# Patient Record
Sex: FEMALE | URBAN - METROPOLITAN AREA
[De-identification: names, ages, dates, MRNs, and addresses within clinical notes are randomized per-mention and may not be internally consistent; named-entity substitution may affect disease eponyms.]

---

## 2024-02-28 ENCOUNTER — OFFICE VISIT (OUTPATIENT)
Dept: PEDIATRICS | Facility: CLINIC | Age: 34
End: 2024-02-28
Payer: MEDICAID

## 2024-02-28 VITALS
BODY MASS INDEX: 37.5 KG/M2 | HEIGHT: 63 IN | DIASTOLIC BLOOD PRESSURE: 69 MMHG | SYSTOLIC BLOOD PRESSURE: 113 MMHG | HEART RATE: 82 BPM | WEIGHT: 211.63 LBS | TEMPERATURE: 98 F

## 2024-02-28 DIAGNOSIS — Z71.84 TRAVEL ADVICE ENCOUNTER: Primary | ICD-10-CM

## 2024-02-28 PROCEDURE — 99999PBSHW HEPATITIS B VACCINE PEDIATRIC / ADOLESCENT 3-DOSE IM: Mod: PBBFAC,,,

## 2024-02-28 PROCEDURE — 90691 TYPHOID VACCINE IM: CPT | Mod: PBBFAC,PN

## 2024-02-28 PROCEDURE — 99203 OFFICE O/P NEW LOW 30 MIN: CPT | Mod: S$PBB,,, | Performed by: PEDIATRICS

## 2024-02-28 PROCEDURE — 90744 HEPB VACC 3 DOSE PED/ADOL IM: CPT | Mod: PBBFAC,PN

## 2024-02-28 PROCEDURE — 1159F MED LIST DOCD IN RCRD: CPT | Mod: CPTII,,, | Performed by: PEDIATRICS

## 2024-02-28 PROCEDURE — 90633 HEPA VACC PED/ADOL 2 DOSE IM: CPT | Mod: PBBFAC,PN

## 2024-02-28 PROCEDURE — 99999PBSHW HEPATITIS A VACCINE PEDIATRIC / ADOLESCENT 2 DOSE IM: Mod: PBBFAC,,,

## 2024-02-28 PROCEDURE — 3078F DIAST BP <80 MM HG: CPT | Mod: CPTII,,, | Performed by: PEDIATRICS

## 2024-02-28 PROCEDURE — 3008F BODY MASS INDEX DOCD: CPT | Mod: CPTII,,, | Performed by: PEDIATRICS

## 2024-02-28 PROCEDURE — 99999PBSHW YELLOW FEVER VACCINE SQ: Mod: PBBFAC,,,

## 2024-02-28 PROCEDURE — 99203 OFFICE O/P NEW LOW 30 MIN: CPT | Mod: PBBFAC,PN | Performed by: PEDIATRICS

## 2024-02-28 PROCEDURE — 3074F SYST BP LT 130 MM HG: CPT | Mod: CPTII,,, | Performed by: PEDIATRICS

## 2024-02-28 PROCEDURE — 90717 YELLOW FEVER VACCINE SUBQ: CPT | Mod: PBBFAC,PN

## 2024-02-28 PROCEDURE — 90471 IMMUNIZATION ADMIN: CPT | Mod: PBBFAC,PN

## 2024-02-28 PROCEDURE — 1160F RVW MEDS BY RX/DR IN RCRD: CPT | Mod: CPTII,,, | Performed by: PEDIATRICS

## 2024-02-28 PROCEDURE — 99999PBSHW TYPHOID VICPS VACCINE IM: Mod: PBBFAC,,,

## 2024-02-28 PROCEDURE — 99999 PR PBB SHADOW E&M-NEW PATIENT-LVL III: CPT | Mod: PBBFAC,,, | Performed by: PEDIATRICS

## 2024-02-28 PROCEDURE — 90472 IMMUNIZATION ADMIN EACH ADD: CPT | Mod: PBBFAC,PN

## 2024-02-28 RX ORDER — MUPIROCIN 20 MG/G
OINTMENT TOPICAL 3 TIMES DAILY
Qty: 22 G | Refills: 0 | Status: SHIPPED | OUTPATIENT
Start: 2024-02-28

## 2024-02-28 RX ORDER — ONDANSETRON 4 MG/1
4 TABLET, FILM COATED ORAL EVERY 8 HOURS PRN
Qty: 4 TABLET | Refills: 0 | Status: SHIPPED | OUTPATIENT
Start: 2024-02-28 | End: 2024-03-03

## 2024-02-28 RX ORDER — ATOVAQUONE AND PROGUANIL HYDROCHLORIDE 250; 100 MG/1; MG/1
TABLET, FILM COATED ORAL
Qty: 18 TABLET | Refills: 0 | Status: SHIPPED | OUTPATIENT
Start: 2024-02-28

## 2024-02-28 NOTE — PROGRESS NOTES
"SUBJECTIVE:  Ana Maria Grossman is a 33 y.o. female here accompanied by spouse, who is a historian.    HPI  Patient presents to the clinic with concerns about  getting yellow fever vaccine, Hep A, Hep B & typhoid vaccine for trip to [facility]. Pt will be traveling March 16 to March 24, 2024.    Gurdeeps allergies, medications, history, and problem list were updated as appropriate.    Review of Systems  A comprehensive review of symptoms was completed and negative except as noted in the HPI.    OBJECTIVE:  Vital signs  Vitals:    02/28/24 1053   BP: 113/69   BP Location: Left arm   Patient Position: Sitting   BP Method: Medium (Automatic)   Pulse: 82   Temp: 98.3 °F (36.8 °C)   TempSrc: Oral   Weight: 96 kg (211 lb 9.6 oz)   Height: 5' 2.5" (1.588 m)        Physical Exam  Vitals reviewed.   Constitutional:       Appearance: Normal appearance.   HENT:      Right Ear: Tympanic membrane normal.      Left Ear: Tympanic membrane normal.      Nose: Nose normal.      Mouth/Throat:      Pharynx: Oropharynx is clear.   Eyes:      Conjunctiva/sclera: Conjunctivae normal.   Cardiovascular:      Rate and Rhythm: Normal rate and regular rhythm.      Heart sounds: Normal heart sounds. No murmur heard.     No friction rub. No gallop.   Pulmonary:      Breath sounds: Normal breath sounds.   Abdominal:      Palpations: Abdomen is soft.      Tenderness: There is no abdominal tenderness.   Musculoskeletal:         General: Normal range of motion.      Cervical back: Neck supple.   Skin:     Findings: No rash.   Neurological:      General: No focal deficit present.           ASSESSMENT/PLAN:  Ana Maria was seen today for travel consult.    Diagnoses and all orders for this visit:    Travel advice encounter  -     Yellow Fever Vaccine (SQ)  -     Typhoid Vaccine (ViCPs) (IM)  -     Hepatitis A Vaccine (Pediatric/Adolescent) (2 Dose) (IM)  -     Hepatitis B Vaccine (Pediatric/Adolescent) (3-Dose) (IM)  -     ondansetron (ZOFRAN) 4 MG tablet; " Take 1 tablet (4 mg total) by mouth every 8 (eight) hours as needed for Nausea.  -     atovaquone-proguaniL (MALARONE) 250-100 mg Tab; Take 1 tablet by mouth 1 day before travel, continue until 1 week after return  -     mupirocin (BACTROBAN) 2 % ointment; Apply topically 3 (three) times daily.     [facility] Travel    Handout provided  Follow instructions listed on hand out for treatment  Call or return to clinic if worsens or does not resolve        No results found for this or any previous visit (from the past 672 hour(s)).    Age appropriate physical activity and nutritional counseling were completed during today's visit.     Follow Up:  No follow-ups on file.    Follow up with Regular MD for:  Hep B in two months and six months  Hep A in six months

## 2024-02-28 NOTE — PATIENT INSTRUCTIONS
....[facility], [place]  Pediatric and Adolescent Medicine  [phone]      General Travel Tips   Learn about your destination- rural vs. urban, accommodations, food and water preparation, geography, i. e. elevation, jungle, etc., season of year, medical services available.     Schedule an office visit for a travel medical consultation.  Try to schedule this at least 6 weeks ahead of travel dates (not always possible).     3.  Obtain your current immunization record prior to the office visit.  If you have received your yellow fever vaccine, find your International Certificate of Vaccination     Dental, eye exam recommended, depending on length of travel.   - extra pair of glasses or contacts recommended.     Obtain appropriate amount of your prescription medicines for your travel duration.  Wear a medic alert bracelet for chronic conditions, i. e. diabetes, seizures and specific allergies.      Make sure you are covered by your health insurance for medical issues during your travel.  Many health insurance plans will not cover international travel.  One such provider is [employer] trip insurance.  < [email or web address]   This is not an endorsement of this insurance provider.     Obtain a list of medical providers at your travel destinations (English speaking).     First aid travel kit:     Analgesics, i. e. Acetaminophen (Tylenol) or Ibuprofen (Motrin, Advil)   Antibacterial wipes and Hand , i. e. Purell   Antibiotic ointment, i. e. Polysporin, Triple Antibiotic   Anti- diarrhea caplets, i. e. Imodium, Pepto Bismol   Antihistamine, i. e. Benadryl   Antihistamine (less sedating), i. e. Zyrtec or Claritin   Bandaids and bandages, i. e. Ace wrap    Decongestant, i. e. Sudafed   Eye drops, i. e. Clear eyes, Visine   Gauze pads   Gentle laxative. i. e. Senekot or MOM    Hydrocortisone cream/ointment   Insect repellent   Lip protectants, i. e. Chapstick,  Blistex   Medical tape   Motion sickness tablets, i. e. dramamine   Tums       Prescription medicines to consider:   Bactroban ointment- topical antibacterial for superficial infections   Zofran- dissolving tablet for nausea and vomiting   * if prescription medicines are desired, discuss at travel consultation     9.  Obtain or find your passport, visa or other necessary identification papers.  Make sure they are up to date.  Bring a photocopy of your passport to carry with you, also.     10. Helpful links are below:  Travel Medicine of [place]: <[email or web address].com/international-travel-medicine/>    [place] Government Tips:  <http://travel.state.gov/travel/tips/tips_1232.html>    MD Travel Health:  <http://www.[employer]/>    Center for Disease Control:  <http://wwwnc.cdc.gov/travel/>    WebMD: <http://emedicine.[employer].com/article/152844-qbdcqqru>             [place], [place]    VACCINES:  Routine Vaccines- routinely given through childhood with boosters in adulthood, i.e. Tetanus (Tdap), Measles, Mumps, Rubella (MMR), Polio, Hepatitis B, Meningococcal (MCV4), Varicella etc..  Many times before international travel a booster is needed.  Make sure you are up to date  Recommended Vaccines:  Hepatitis A (two shot series minimum six months apart), Typhoid (one injection)  Required Vaccines:  Yellow Fever is recommended, not Required, if travel to [facility]    MALARIA:  Malaria Prophylaxis- Recommended because you will be traveling into some malaria infected areas ([facility]).  Chloroquine is not effective in this region.  Options for prophylaxis are Atovaquone-proguanil (Malarone) or Mefloquine or Doxycycline.    - Malarone is recommended by [facility] pharmacists.  Start 1 day before, stop 1 wk. after return.  Side Effects- nausea, vomiting, headache, weakness, dizziness    Mefloquine is taken once per week starting a two days before travel and continued for 4  weeks after returning home.  Side effects include dizziness, headache, insomnia, nightmares, depression and anxiety.  Contraindicated in those that have depression, cardiac conduction disorders.  Doxycycline is taken daily starting two days before travel until four weeks after returning home.  Minimal side effects- mild GI upet.  Malarone is taken daily, starting 1 day before and stopping 1 week after return.    Protective Measures:   Use insecticide-impregnated mosquito nets while sleeping.   Remain in well-screened areas.   Wear protective clothing.   Use mosquito repellents containing DEET.    <http://www.cdc.gov/malaria/>  To prevent typhoid and GI illness food preparation is very important.  Protective Measures:   - Food preparation- Boil it, cook it, peel it or forget it  Bottled water, boil for 1 minute; bottled carbonated water noncarbonated.  Ice, popsicles, flavored ice only from boiled or bottled water  Thoroughly cooked food, still hot and steaming.  Avoid raw vegetables that are not peeled or lettuce.   - Peel vegetables yourself (after washing hands), do not eat peelings.  E. Avoid foods, beverages bought from .      If you need any help, let me know